# Patient Record
Sex: FEMALE | Race: WHITE
[De-identification: names, ages, dates, MRNs, and addresses within clinical notes are randomized per-mention and may not be internally consistent; named-entity substitution may affect disease eponyms.]

---

## 2021-12-25 NOTE — NUR
VANCOMYCIN TROUGH CAME BACK AS 2.7, PHARMACY WAS NOTIFIED. I WAS TOLD TO HOLD
THE 0600 DOSE, AND THEY ORDERED A STAT REDRAW OF THE TROUGH.

## 2021-12-25 NOTE — NUR
PATIENT HAS REPORTED PAIN MULTIPLE TIMES AND COULD HAVE RECIEVED HYDROCODONE,
HOWEVER SHE REFUSED IT. I
EXPLAINED TO THE PATIENT THAT THE MORPHINE WOULD WORK NOW, AND BY THE TIME
THAT IT WEARS OFF, THE HYDROCODONE SHOULD START TO WORK. HER MOTHER WAS ON THE
PHONE AND ASKED IF THERE WAS NOT ANYTHING ELSE I COULD GIVE HER FOR PAIN, AND
I EXPLAINED THAT SHE HAD HYDROCODONE SHE COULD HAVE. SHE TOOK THE HYDROCODONE
AND THE MORPHINE. I CHECKED BACK IN ONE HOUR AND THE PATIENT STARTED CRYING
AND SAID THAT SHE WANTS TO BE "KNOCKED OUT" BECAUSE THE MEDICATION IS NOT
HELPING AT ALL. I CALLED DR. MONCADA TO EXPLAIN THE SITUATION AND HE ORDERED
0.5 MG DILAUDID ONE TIME. I CONFIRMED THE ORDER VIA TELEPHONE WITH READBACK.

## 2021-12-27 NOTE — NUR
PT SURGICAL SITE COVERED IN BULKY POST OP DRESSING AND WRAPPED IN ACE. DRSG IS
CDI. PT HAS NO COMPLAINTS. DR WHITFIELD STATED THAT PT COULD D/C HOME TODAY BUT
PATIENT WANTS TO STAY FOR ONE MORE NIGHT BECAUSE SHE DOESNT FEEL CONFORTABLE
ENOUGH TO GO HOME YET AT THIS TIME. WCTM. ALL VITALS AND ASSESSMENT WNL.

## 2021-12-28 NOTE — NUR
PT PROVIDED WITH VERBAL WRITTEN AND DEMONSTRATION EDUCATION FOR INSULIN,
ACCUCHECKS, AND ADMINISTRATION SQ. PT ALSO INSTRUCTED ON HOW TO CHANGE
DRESSING AND PROVIDED WITH SUPPLIES TO DO SO. ALSO EDUCATED  ON IMPORTANCE
OF GLUCOSE CONTROL IN ORDER FOR WOUNDS TO HEAL.
PT
AND PT FAMILY AT BEDSIDE BOTH
VERBALIZED UNDERSTANDING OF EDUCATION PROVIDED.

## 2022-05-18 ENCOUNTER — HOSPITAL ENCOUNTER (EMERGENCY)
Dept: HOSPITAL 79 - ER1 | Age: 27
Discharge: HOME | End: 2022-05-18
Payer: SELF-PAY

## 2022-05-18 DIAGNOSIS — E11.65: ICD-10-CM

## 2022-05-18 DIAGNOSIS — M79.662: Primary | ICD-10-CM

## 2022-05-18 LAB
BUN/CREATININE RATIO: 20 (ref 0–10)
HGB BLD-MCNC: 15.2 GM/DL (ref 12.3–15.3)
RED BLOOD COUNT: 5.51 M/UL (ref 4–5.1)
WHITE BLOOD COUNT: 9.4 K/UL (ref 4.5–11)

## 2022-05-20 NOTE — NUR
while doing hourly rounding I have found the patient several times crying in
pain. I have offered to bring her hydrocodone 5mg that she has ordered PRN for
pain. She states that it doesn't help her and refuses to take them, she only
wants morphine. I tried to educate the patient on her pain medication, and
explain that we could give her morphine one time and rotate it with the
hydrocodone next time. This would give her a better coverage than only taking
the morphine. But each time she refuses. No

## 2024-01-22 NOTE — NUR
after reassessment the patient states she is still hurting and neither the
morphine nor the hydrocodone has touched her pain. Marcelo called Dr. Riggs
and he gave a one time order for dilaudid 0.5mg IVP one time. We will reassess
her after the medication is given. Detail Level: Detailed Photo Preface (Leave Blank If You Do Not Want): Photographs were obtained today